# Patient Record
Sex: FEMALE | Race: WHITE | NOT HISPANIC OR LATINO | URBAN - NONMETROPOLITAN AREA
[De-identification: names, ages, dates, MRNs, and addresses within clinical notes are randomized per-mention and may not be internally consistent; named-entity substitution may affect disease eponyms.]

---

## 2018-08-13 NOTE — PATIENT DISCUSSION
New Prescription: erythromycin (erythromycin): ointment: 5 mg/gram (0.5 %) a small amount twice a day as directed on eyelid 08-

## 2018-08-13 NOTE — PATIENT DISCUSSION
LESION LLL: I have discussed options with the patient, surgery versus follow. The risks, benefits, alternatives and alternatives include anesthesia, bleeding, infection, inflammation, swelling, bruising. The patient understands and desires to remove LLL lesion. The lesion will be sent to pathology to rule out cancer. An RX was given for Erythromycin ointment to be applied to the affected area twice a day until resolved.

## 2022-09-20 ENCOUNTER — NEW PATIENT (OUTPATIENT)
Dept: URBAN - NONMETROPOLITAN AREA CLINIC 4 | Facility: CLINIC | Age: 72
End: 2022-09-20

## 2022-09-20 DIAGNOSIS — H25.813: ICD-10-CM

## 2022-09-20 PROCEDURE — 92004 COMPRE OPH EXAM NEW PT 1/>: CPT

## 2022-09-20 ASSESSMENT — VISUAL ACUITY
OD_CC: 20/30+1
OS_CC: 20/30+2
OD_BAT: 20/50

## 2022-09-20 ASSESSMENT — TONOMETRY
OS_IOP_MMHG: 15
OD_IOP_MMHG: 15

## 2022-10-17 ENCOUNTER — CONSULTATION/EVALUATION (OUTPATIENT)
Dept: RURAL CLINIC 1 | Facility: CLINIC | Age: 72
End: 2022-10-17

## 2022-10-17 DIAGNOSIS — H25.813: ICD-10-CM

## 2022-10-17 PROCEDURE — 92014 COMPRE OPH EXAM EST PT 1/>: CPT

## 2022-10-17 RX ORDER — LOTEPREDNOL ETABONATE 5 MG/G: 1 GEL OPHTHALMIC TWICE A DAY

## 2022-10-17 ASSESSMENT — VISUAL ACUITY
OU_SC: 20/40-1
OU_CC: 20/25-2
OD_PAM: 20/25
OU_SC: 20/100
OD_SC: 20/70-1
OS_SC: 20/50-1
OD_CC: 20/25-2
OS_AM: 20/30
OS_PH: 20/25-1
OS_CC: 20/40-1
OD_BAT: 20/40
OU_CC: 20/30
OS_BAT: 20/40

## 2022-10-17 ASSESSMENT — KERATOMETRY
OD_K2POWER_DIOPTERS: 41.25
OS_AXISANGLE_DEGREES: 180
OD_AXISANGLE_DEGREES: 20
OD_K1POWER_DIOPTERS: 40.50
OS_K2POWER_DIOPTERS: 42.00
OD_AXISANGLE2_DEGREES: 110
OS_AXISANGLE2_DEGREES: 090
OS_K1POWER_DIOPTERS: 41.25

## 2022-10-17 ASSESSMENT — TONOMETRY
OS_IOP_MMHG: 14
OD_IOP_MMHG: 13

## 2022-11-14 ENCOUNTER — PRE-OP/H&P (OUTPATIENT)
Dept: RURAL CLINIC 1 | Facility: CLINIC | Age: 72
End: 2022-11-14

## 2022-11-14 VITALS
BODY MASS INDEX: 37.28 KG/M2 | WEIGHT: 232 LBS | SYSTOLIC BLOOD PRESSURE: 124 MMHG | DIASTOLIC BLOOD PRESSURE: 81 MMHG | HEART RATE: 75 BPM | HEIGHT: 66 IN

## 2022-11-14 DIAGNOSIS — H25.813: ICD-10-CM

## 2022-11-14 DIAGNOSIS — Z01.818: ICD-10-CM

## 2022-11-14 PROCEDURE — 99499 UNLISTED E&M SERVICE: CPT

## 2022-11-14 ASSESSMENT — KERATOMETRY
OD_K1POWER_DIOPTERS: 40.50
OD_AXISANGLE2_DEGREES: 110
OD_AXISANGLE_DEGREES: 30
OD_K2POWER_DIOPTERS: 41.50
OD_K1POWER_DIOPTERS: 41.50
OD_K2POWER_DIOPTERS: 41.25
OS_AXISANGLE_DEGREES: 180
OS_K1POWER_DIOPTERS: 41.50
OS_K2POWER_DIOPTERS: 42.00
OS_K1POWER_DIOPTERS: 41.25
OD_AXISANGLE2_DEGREES: 120
OD_AXISANGLE_DEGREES: 20
OS_AXISANGLE2_DEGREES: 090

## 2022-11-22 ENCOUNTER — POST-OP (OUTPATIENT)
Dept: URBAN - NONMETROPOLITAN AREA CLINIC 4 | Facility: CLINIC | Age: 72
End: 2022-11-22

## 2022-11-22 ENCOUNTER — SURGERY/PROCEDURE (OUTPATIENT)
Dept: RURAL CLINIC 1 | Facility: CLINIC | Age: 72
End: 2022-11-22

## 2022-11-22 DIAGNOSIS — Z96.1: ICD-10-CM

## 2022-11-22 DIAGNOSIS — H25.811: ICD-10-CM

## 2022-11-22 PROCEDURE — 66984 XCAPSL CTRC RMVL W/O ECP: CPT

## 2022-11-22 PROCEDURE — 68841 INSJ RX ELUT IMPLT LAC CANAL: CPT

## 2022-11-22 PROCEDURE — 99024 POSTOP FOLLOW-UP VISIT: CPT

## 2022-11-22 ASSESSMENT — KERATOMETRY
OS_K2POWER_DIOPTERS: 42.00
OD_K2POWER_DIOPTERS: 41.25
OD_AXISANGLE_DEGREES: 20
OD_AXISANGLE2_DEGREES: 110
OD_AXISANGLE2_DEGREES: 120
OD_K1POWER_DIOPTERS: 41.50
OD_AXISANGLE_DEGREES: 30
OS_AXISANGLE_DEGREES: 180
OD_K2POWER_DIOPTERS: 41.50
OS_K1POWER_DIOPTERS: 41.50
OS_AXISANGLE2_DEGREES: 090
OS_K1POWER_DIOPTERS: 41.25
OD_K1POWER_DIOPTERS: 40.50

## 2022-11-22 ASSESSMENT — VISUAL ACUITY
OD_SC: 20/70
OS_SC: 20/50
OS_PH: 20/25
OD_PH: 20/40

## 2022-11-22 ASSESSMENT — TONOMETRY
OS_IOP_MMHG: 16
OD_IOP_MMHG: 20

## 2022-11-30 ASSESSMENT — KERATOMETRY
OD_AXISANGLE2_DEGREES: 110
OD_K1POWER_DIOPTERS: 40.50
OD_K2POWER_DIOPTERS: 41.25
OS_AXISANGLE_DEGREES: 180
OS_AXISANGLE2_DEGREES: 090
OS_K2POWER_DIOPTERS: 42.00
OS_K1POWER_DIOPTERS: 41.25
OD_AXISANGLE_DEGREES: 20

## 2022-12-13 ENCOUNTER — SURGERY/PROCEDURE (OUTPATIENT)
Dept: URBAN - METROPOLITAN AREA SURGERY 3 | Facility: SURGERY | Age: 72
End: 2022-12-13

## 2022-12-13 PROCEDURE — 66984 XCAPSL CTRC RMVL W/O ECP: CPT

## 2022-12-13 PROCEDURE — 68841 INSJ RX ELUT IMPLT LAC CANAL: CPT

## 2022-12-14 ENCOUNTER — POST-OP (OUTPATIENT)
Dept: RURAL CLINIC 1 | Facility: CLINIC | Age: 72
End: 2022-12-14

## 2022-12-14 PROCEDURE — 99024 POSTOP FOLLOW-UP VISIT: CPT

## 2022-12-14 ASSESSMENT — KERATOMETRY
OS_K1POWER_DIOPTERS: 41.25
OD_K1POWER_DIOPTERS: 40.50
OS_K1POWER_DIOPTERS: 41.50
OD_K1POWER_DIOPTERS: 41.50
OS_K2POWER_DIOPTERS: 42.00
OD_K2POWER_DIOPTERS: 41.50
OD_AXISANGLE2_DEGREES: 120
OD_K2POWER_DIOPTERS: 41.25
OS_AXISANGLE2_DEGREES: 090
OD_AXISANGLE_DEGREES: 30
OD_AXISANGLE2_DEGREES: 110
OS_AXISANGLE_DEGREES: 180
OD_AXISANGLE_DEGREES: 20

## 2022-12-14 ASSESSMENT — TONOMETRY: OS_IOP_MMHG: 19

## 2022-12-14 ASSESSMENT — VISUAL ACUITY: OS_SC: 20/30

## 2022-12-20 ENCOUNTER — POST-OP (OUTPATIENT)
Dept: RURAL CLINIC 1 | Facility: CLINIC | Age: 72
End: 2022-12-20

## 2022-12-20 PROCEDURE — 99024 POSTOP FOLLOW-UP VISIT: CPT

## 2022-12-20 ASSESSMENT — KERATOMETRY
OS_AXISANGLE2_DEGREES: 090
OD_AXISANGLE2_DEGREES: 110
OD_K2POWER_DIOPTERS: 41.25
OD_AXISANGLE_DEGREES: 20
OS_K1POWER_DIOPTERS: 41.50
OS_AXISANGLE_DEGREES: 180
OD_K2POWER_DIOPTERS: 41.50
OS_K1POWER_DIOPTERS: 41.25
OD_AXISANGLE2_DEGREES: 120
OD_K1POWER_DIOPTERS: 41.50
OD_K1POWER_DIOPTERS: 40.50
OS_K2POWER_DIOPTERS: 42.00
OD_AXISANGLE_DEGREES: 30

## 2022-12-20 ASSESSMENT — TONOMETRY
OS_IOP_MMHG: 18
OD_IOP_MMHG: 17

## 2022-12-20 ASSESSMENT — VISUAL ACUITY
OU_SC: 20/20
OS_SC: 20/20
OD_SC: 20/30

## 2022-12-22 ASSESSMENT — KERATOMETRY
OS_AXISANGLE_DEGREES: 180
OS_AXISANGLE2_DEGREES: 090
OD_AXISANGLE2_DEGREES: 110
OD_K2POWER_DIOPTERS: 41.25
OS_K1POWER_DIOPTERS: 41.25
OS_K2POWER_DIOPTERS: 42.00
OD_K1POWER_DIOPTERS: 40.50
OD_AXISANGLE_DEGREES: 20

## 2023-06-15 ENCOUNTER — ESTABLISHED PATIENT (OUTPATIENT)
Dept: URBAN - NONMETROPOLITAN AREA CLINIC 4 | Facility: CLINIC | Age: 73
End: 2023-06-15

## 2023-06-15 DIAGNOSIS — H26.493: ICD-10-CM

## 2023-06-15 DIAGNOSIS — Z96.1: ICD-10-CM

## 2023-06-15 DIAGNOSIS — Z98.890: ICD-10-CM

## 2023-06-15 DIAGNOSIS — H16.223: ICD-10-CM

## 2023-06-15 PROCEDURE — 92014 COMPRE OPH EXAM EST PT 1/>: CPT

## 2023-06-15 ASSESSMENT — KERATOMETRY
OS_AXISANGLE_DEGREES: 180
OS_K2POWER_DIOPTERS: 42.00
OD_AXISANGLE2_DEGREES: 110
OD_AXISANGLE_DEGREES: 30
OS_K1POWER_DIOPTERS: 41.25
OD_K1POWER_DIOPTERS: 40.50
OD_AXISANGLE2_DEGREES: 120
OD_K2POWER_DIOPTERS: 41.25
OD_K1POWER_DIOPTERS: 41.50
OD_AXISANGLE_DEGREES: 20
OD_K2POWER_DIOPTERS: 41.50
OS_AXISANGLE2_DEGREES: 090
OS_K1POWER_DIOPTERS: 41.50

## 2023-06-15 ASSESSMENT — TONOMETRY
OD_IOP_MMHG: 16
OS_IOP_MMHG: 16

## 2023-06-15 ASSESSMENT — VISUAL ACUITY
OS_SC: 20/25
OU_CC: 20/20
OU_SC: 20/25
OD_CC: 20/20
OD_SC: 20/30
OS_CC: 20/20

## 2024-08-06 ENCOUNTER — COMPREHENSIVE EXAM (OUTPATIENT)
Dept: RURAL CLINIC 1 | Facility: CLINIC | Age: 74
End: 2024-08-06

## 2024-08-06 DIAGNOSIS — Z98.890: ICD-10-CM

## 2024-08-06 DIAGNOSIS — H26.493: ICD-10-CM

## 2024-08-06 DIAGNOSIS — H16.223: ICD-10-CM

## 2024-08-06 DIAGNOSIS — Z96.1: ICD-10-CM

## 2024-08-06 PROCEDURE — 92014 COMPRE OPH EXAM EST PT 1/>: CPT

## 2024-08-06 ASSESSMENT — VISUAL ACUITY
OD_SC: 20/200
OS_SC: 20/200
OS_SC: 20/50
OU_SC: 20/200
OU_SC: 20/50+2
OD_SC: 20/50-1

## 2024-08-06 ASSESSMENT — TONOMETRY
OS_IOP_MMHG: 17
OD_IOP_MMHG: 17

## 2024-08-19 ENCOUNTER — CONSULTATION/EVALUATION (OUTPATIENT)
Dept: RURAL CLINIC 1 | Facility: CLINIC | Age: 74
End: 2024-08-19

## 2024-08-19 DIAGNOSIS — H16.223: ICD-10-CM

## 2024-08-19 DIAGNOSIS — H26.492: ICD-10-CM

## 2024-08-19 DIAGNOSIS — Z98.890: ICD-10-CM

## 2024-08-19 DIAGNOSIS — Z96.1: ICD-10-CM

## 2024-08-19 PROCEDURE — 66821 AFTER CATARACT LASER SURGERY: CPT

## 2024-08-19 PROCEDURE — 92014 COMPRE OPH EXAM EST PT 1/>: CPT

## 2024-08-19 ASSESSMENT — VISUAL ACUITY
OS_PH: 20/30
OD_PH: 20/20-2
OD_SC: 20/30-1
OD_SC: 20/200
OS_SC: 20/200
OS_SC: 20/40

## 2024-08-19 ASSESSMENT — TONOMETRY
OS_IOP_MMHG: 9
OD_IOP_MMHG: 10